# Patient Record
Sex: FEMALE | Race: WHITE | HISPANIC OR LATINO | Employment: UNEMPLOYED | URBAN - METROPOLITAN AREA
[De-identification: names, ages, dates, MRNs, and addresses within clinical notes are randomized per-mention and may not be internally consistent; named-entity substitution may affect disease eponyms.]

---

## 2023-08-24 ENCOUNTER — OFFICE VISIT (OUTPATIENT)
Dept: URGENT CARE | Facility: CLINIC | Age: 63
End: 2023-08-24
Payer: COMMERCIAL

## 2023-08-24 VITALS
RESPIRATION RATE: 20 BRPM | OXYGEN SATURATION: 99 % | WEIGHT: 172 LBS | SYSTOLIC BLOOD PRESSURE: 99 MMHG | HEART RATE: 86 BPM | TEMPERATURE: 96.8 F | DIASTOLIC BLOOD PRESSURE: 53 MMHG

## 2023-08-24 DIAGNOSIS — K04.7 DENTAL INFECTION: Primary | ICD-10-CM

## 2023-08-24 PROCEDURE — 99204 OFFICE O/P NEW MOD 45 MIN: CPT | Performed by: PHYSICIAN ASSISTANT

## 2023-08-24 PROCEDURE — S9088 SERVICES PROVIDED IN URGENT: HCPCS | Performed by: PHYSICIAN ASSISTANT

## 2023-08-24 RX ORDER — PENICILLIN V POTASSIUM 500 MG/1
500 TABLET ORAL 2 TIMES DAILY
Qty: 20 TABLET | Refills: 0 | Status: SHIPPED | OUTPATIENT
Start: 2023-08-24 | End: 2023-09-03

## 2023-08-24 NOTE — PROGRESS NOTES
[facility]        NAME: Augustus Fabian is a 65 y.o. female  : 1960    MRN: [id number]  DATE: 2023  TIME: 12:08 PM      Assessment and Plan     Dental infection [K04.7]  1. Dental infection  penicillin V potassium (VEETID) 500 mg tablet            Patient Instructions   There are no Patient Instructions on file for this visit. Follow up with PCP in 3-5 days. Go to ER if symptoms worsen. Chief Complaint     Chief Complaint   Patient presents with   • Dental Pain     For last 3 days has had left sided dental pain. Pain rated 10/10 on pain scale took 2 tylenol without relief. Unable to get in with a oral surgeon until . Insurance told the patient to come to urgent care. History of Present Illness     Patient presents with left upper dental pain x 3 days. She states the tooth has been giving her issues for 6 months. She has a consult with oral surgery on 2023. Review of Systems     Review of Systems   Constitutional: Negative for chills, fatigue and fever. HENT: Positive for dental problem. Negative for congestion, ear pain, postnasal drip, rhinorrhea, sinus pressure, sinus pain and sore throat. Eyes: Negative for pain and visual disturbance. Respiratory: Negative for cough, chest tightness and shortness of breath. Cardiovascular: Negative for chest pain and palpitations. Gastrointestinal: Negative for abdominal pain, diarrhea, nausea and vomiting. Genitourinary: Negative for dysuria and hematuria. Musculoskeletal: Negative for arthralgias, back pain and myalgias. Skin: Negative for rash. Neurological: Negative for dizziness, seizures, syncope, numbness and headaches. All other systems reviewed and are negative.         Current Medications       Current Outpatient Medications:   •  penicillin V potassium (VEETID) 500 mg tablet, Take 1 tablet (500 mg total) by mouth 2 (two) times a day for 10 days, Disp: 20 tablet, Rfl: 0    Current Allergies Allergies as of 08/24/2023   • (No Known Allergies)              The following portions of the patient's history were reviewed and updated as appropriate: allergies, current medications, past family history, past medical history, past social history, past surgical history, and problem list.     Past Medical History:   Diagnosis Date   • Diabetes mellitus ([address]) 2022    pre diabetic       History reviewed. No pertinent surgical history. History reviewed. No pertinent family history. Medications have been verified. Objective     BP 99/53   Pulse 86   Temp (!) 96.8 °F (36 °C)   Resp 20   Wt 78 kg (172 lb)   SpO2 99%   No LMP recorded. Physical Exam     Physical Exam  Vitals and nursing note reviewed. Constitutional:       Appearance: Normal appearance. She is normal weight. HENT:      Head: Normocephalic and atraumatic. Mouth/Throat:        Comments: Pain, gingival swelling, erythema, and tenderness on left upper arch. Cardiovascular:      Rate and Rhythm: Normal rate and regular rhythm. Heart sounds: Normal heart sounds. Pulmonary:      Effort: Pulmonary effort is normal.      Breath sounds: Normal breath sounds. Skin:     General: Skin is warm and dry. Neurological:      General: No focal deficit present. Mental Status: She is alert and oriented to person, place, and time.    Psychiatric:         Mood and Affect: Mood normal.         Behavior: Behavior normal.

## 2024-07-28 ENCOUNTER — OFFICE VISIT (OUTPATIENT)
Dept: URGENT CARE | Facility: CLINIC | Age: 64
End: 2024-07-28
Payer: COMMERCIAL

## 2024-07-28 VITALS
TEMPERATURE: 101.2 F | OXYGEN SATURATION: 97 % | RESPIRATION RATE: 16 BRPM | WEIGHT: 172 LBS | SYSTOLIC BLOOD PRESSURE: 118 MMHG | DIASTOLIC BLOOD PRESSURE: 86 MMHG | HEART RATE: 112 BPM

## 2024-07-28 DIAGNOSIS — R50.9 FEVER, UNSPECIFIED FEVER CAUSE: Primary | ICD-10-CM

## 2024-07-28 LAB
SARS-COV-2 AG UPPER RESP QL IA: NEGATIVE
VALID CONTROL: NORMAL

## 2024-07-28 PROCEDURE — S9088 SERVICES PROVIDED IN URGENT: HCPCS | Performed by: PHYSICIAN ASSISTANT

## 2024-07-28 PROCEDURE — 99213 OFFICE O/P EST LOW 20 MIN: CPT | Performed by: PHYSICIAN ASSISTANT

## 2024-07-28 PROCEDURE — 87636 SARSCOV2 & INF A&B AMP PRB: CPT | Performed by: PHYSICIAN ASSISTANT

## 2024-07-28 RX ORDER — CARVEDILOL 3.12 MG/1
3.12 TABLET ORAL 2 TIMES DAILY WITH MEALS
COMMUNITY
Start: 2024-06-29

## 2024-07-28 NOTE — PROGRESS NOTES
[facility]        NAME: Mariela Weaver is a 63 y.o. female  : 1960    MRN: [id number]  DATE: 2024  TIME: 2:16 PM    Assessment and Plan   Fever, unspecified fever cause [R50.9]  1. Fever, unspecified fever cause  Poct Covid 19 Rapid Antigen Test    Covid/Flu- Office Collect Normal          Will contact patient if COVID test is positive recommend continued conservative treatments    The patient and/or parent/legal guardian verbalized understanding of exam findings and   Treatment plan. We engaged in the shared decision-making process and treatment options were   discussed at length with the patient.  All questions, concerns and complaints were answered and   addressed to the patient's' and/or parent/legal guardians's satisfaction.    Patient Instructions   There are no Patient Instructions on file for this visit.    Follow up with PCP in 3-5 days.  Proceed to  ER if symptoms worsen.    If tests are performed, our office will contact you with results only if   changes need to made to the care plan discussed with you at the visit.   [facility]     Chief Complaint     Chief Complaint   Patient presents with   • covid symptoms     Pt c/o fever, chills, body aches, nausea, fatigue that started 3 days          History of Present Illness       HPI  Pt reports fever chills myalgias nausea fatigue x 3 days. No sore throat or ear ache. Dizziness/weakness. Has had decreased PO intake. Nausea has improved. No cough or SOB or CP. Has taken nyquil with relief. No abdominal pain.     Review of Systems   Review of Systems  All other related systems reviewed and are negative except as noted in HPI    Current Medications       Current Outpatient Medications:   •  carvedilol (COREG) 3.125 mg tablet, Take 3.125 mg by mouth 2 (two) times a day with meals With morning and evening, Disp: , Rfl:     Current Allergies     Allergies as of 2024   • (No Known Allergies)  "           The following portions of the patient's history were reviewed and updated as appropriate: allergies, current medications, past family history, past medical history, past social history, past surgical history and problem list.     Past Medical History:   Diagnosis Date   • Diabetes mellitus ([facility]) 2022    pre diabetic   • Hypertension        History reviewed. No pertinent surgical history.    History reviewed. No pertinent family history.      Medications have been verified.        Objective   /86   Pulse (!) 112   Temp (!) 101.2 °F (38.4 °C) (Oral)   Resp 16   Wt 78 kg (172 lb)   SpO2 97%   No LMP recorded.       Physical Exam     Physical Exam  Constitutional:       General: She is not in acute distress.     Appearance: She is well-developed. She is not diaphoretic.   HENT:      Head: Normocephalic and atraumatic.   Eyes:      General: No scleral icterus.     Conjunctiva/sclera: Conjunctivae normal.   Neck:      Trachea: No tracheal deviation.   Cardiovascular:      Rate and Rhythm: Normal rate and regular rhythm.      Heart sounds: Normal heart sounds. No murmur heard.  Pulmonary:      Effort: Pulmonary effort is normal. No respiratory distress.      Breath sounds: Normal breath sounds. No stridor. No wheezing, rhonchi or rales.   Musculoskeletal:      Cervical back: Normal range of motion and neck supple.   Lymphadenopathy:      Cervical: No cervical adenopathy.   Skin:     General: Skin is warm and dry.      Findings: No erythema.   Neurological:      Mental Status: She is alert and oriented to person, place, and time.   Psychiatric:         Behavior: Behavior normal.         Ortho Exam        Procedures  No Procedures performed today        Note: Portions of this record may have been created with voice recognition software. Occasional wrong word or \"sound a like\" substitutions may have occurred due to the inherent limitations of voice recognition software. Please read the chart carefully " and recognize, using context, where substitutions have occurred.*

## 2024-07-29 LAB
FLUAV RNA RESP QL NAA+PROBE: NEGATIVE
FLUBV RNA RESP QL NAA+PROBE: NEGATIVE
SARS-COV-2 RNA RESP QL NAA+PROBE: NEGATIVE